# Patient Record
Sex: MALE | Race: WHITE | NOT HISPANIC OR LATINO | URBAN - METROPOLITAN AREA
[De-identification: names, ages, dates, MRNs, and addresses within clinical notes are randomized per-mention and may not be internally consistent; named-entity substitution may affect disease eponyms.]

---

## 2022-05-09 ENCOUNTER — OFFICE VISIT (OUTPATIENT)
Dept: PODIATRY | Facility: CLINIC | Age: 72
End: 2022-05-09
Payer: MEDICARE

## 2022-05-09 DIAGNOSIS — L60.3 ONYCHODYSTROPHY: ICD-10-CM

## 2022-05-09 DIAGNOSIS — Z79.01 CURRENT USE OF LONG TERM ANTICOAGULATION: Primary | ICD-10-CM

## 2022-05-09 DIAGNOSIS — I82.4Y9 ACUTE DEEP VEIN THROMBOSIS (DVT) OF PROXIMAL VEIN OF LOWER EXTREMITY, UNSPECIFIED LATERALITY: ICD-10-CM

## 2022-05-09 PROCEDURE — 99202 OFFICE O/P NEW SF 15 MIN: CPT | Mod: PBBFAC | Performed by: PODIATRIST

## 2022-05-09 PROCEDURE — 11721 DEBRIDE NAIL 6 OR MORE: CPT | Mod: PBBFAC | Performed by: PODIATRIST

## 2022-05-09 PROCEDURE — 11721 PR DEBRIDEMENT OF NAILS, 6 OR MORE: ICD-10-PCS | Mod: Q9,S$PBB,, | Performed by: PODIATRIST

## 2022-05-09 PROCEDURE — 99202 PR OFFICE/OUTPT VISIT, NEW, LEVL II, 15-29 MIN: ICD-10-PCS | Mod: 25,S$PBB,, | Performed by: PODIATRIST

## 2022-05-09 PROCEDURE — 99202 OFFICE O/P NEW SF 15 MIN: CPT | Mod: 25,S$PBB,, | Performed by: PODIATRIST

## 2022-05-09 PROCEDURE — 11721 DEBRIDE NAIL 6 OR MORE: CPT | Mod: Q9,S$PBB,, | Performed by: PODIATRIST

## 2022-05-09 PROCEDURE — 99999 PR PBB SHADOW E&M-NEW PATIENT-LVL II: CPT | Mod: PBBFAC,,, | Performed by: PODIATRIST

## 2022-05-09 PROCEDURE — 99999 PR PBB SHADOW E&M-NEW PATIENT-LVL II: ICD-10-PCS | Mod: PBBFAC,,, | Performed by: PODIATRIST

## 2022-05-10 NOTE — PROGRESS NOTES
Subjective:       Patient ID: Tyson Lunsford is a 71 y.o. male.    Chief Complaint: Nail Care (Patient is non diabetic and was last seen on 3.31.22 by Josse Schmitz. Patient is here by [facility] services. He is a resident at [facility]. He continues lovenox injections. )      HPI:  Patient presents to the office this today via ambulance service for elongated and thickened toenails. He sees Josse Schmitz NP at [facility] with last visit on 3/31/22. He is on long term anticoagulation therapy. He is non-ambulatory. Denies recent trauma which could lead to the diagnoses of nail dystrophy.     Review of patient's allergies indicates:  No Known Allergies    History reviewed. No pertinent past medical history.    History reviewed. No pertinent family history.    Social History     Socioeconomic History    Marital status: Single       History reviewed. No pertinent surgical history.    Review of Systems       Objective:   There were no vitals taken for this visit.    No image results found.       Physical Exam    LOWER EXTREMITY PHYSICAL EXAMINATION  NEUROLOGY: Sensation to light touch is intact. Proprioception is intact.     DERMATOLOGY:  Skin is supple, dry and intact. No ulcerations are noted. No hyperkeratosis or calluses are noted. No ecchymosis appreciated.  There are nail changes which are consistent with onychomycosis on the right and left foot. LEFT:On the left foot, nail #1, #2, #3, #4 and #5 is/are thickened, is/are dystrophic, with yellow discoloration, and with malodorous subungual debris.   RIGHT:On the right foot, nail #1, #2, #3, #4 and #5 is/are thickened, is/are dystrophic, with yellow discoloration, and with malodorous subungual debris    ORTHOPEDIC: Manual Muscle Testing is 5/5 in all planes on the left and right, without pains, with and without resistance. Gait pattern is non-antalgic.    VASCULAR: The right DP pulse is 2/4 and the left DP is 2/4. The right PT  pulse is 2/4 and the left PT pulse is 2/4. Proximal to distal, warm to warm. No dependent rubor or elevation palor is noted. Capillary refill time is less than 3 seconds. Hair growth is appreciated to the dorsal foot and digits.    Assessment:     1. Current use of long term anticoagulation    2. Nursing home resident    3. Onychodystrophy    4. Acute deep vein thrombosis (DVT) of proximal vein of lower extremity, unspecified laterality        Plan:     Current use of long term anticoagulation    Nursing home resident    Onychodystrophy    Acute deep vein thrombosis (DVT) of proximal vein of lower extremity, unspecified laterality      We discussed both conservative and surgical treatment for onychomycosis.  Based on patient's history, recommend as conservative therapy.    The onychomycotic nail plates, as outlined above (Right #1, #2, #3, #4 and #5 ;Left #1, #2, #3, #4 and #5), are sharply debrided with double action nail nipper, and/or with the assistance of a mechanical rotary fidelia, with removal of all offending nail and nail border(s), for reduction of pains. Nails are reduced in terms of length, width and girth with removal of subungual debris to facilitate pain free weight bearing and ambulation.     No future appointments.